# Patient Record
Sex: MALE | Employment: OTHER | URBAN - METROPOLITAN AREA
[De-identification: names, ages, dates, MRNs, and addresses within clinical notes are randomized per-mention and may not be internally consistent; named-entity substitution may affect disease eponyms.]

---

## 2018-07-30 ENCOUNTER — APPOINTMENT (OUTPATIENT)
Dept: CT IMAGING | Age: 68
End: 2018-07-30
Attending: EMERGENCY MEDICINE
Payer: COMMERCIAL

## 2018-07-30 ENCOUNTER — HOSPITAL ENCOUNTER (EMERGENCY)
Age: 68
Discharge: HOME | End: 2018-07-30
Attending: EMERGENCY MEDICINE
Payer: COMMERCIAL

## 2018-07-30 VITALS
DIASTOLIC BLOOD PRESSURE: 65 MMHG | OXYGEN SATURATION: 100 % | SYSTOLIC BLOOD PRESSURE: 142 MMHG | RESPIRATION RATE: 16 BRPM | TEMPERATURE: 98.4 F | HEART RATE: 76 BPM

## 2018-07-30 DIAGNOSIS — S05.11XA CONTUSION OF RIGHT EYE, INITIAL ENCOUNTER: Primary | ICD-10-CM

## 2018-07-30 DIAGNOSIS — H11.31 CONJUNCTIVAL HEMORRHAGE OF RIGHT EYE: ICD-10-CM

## 2018-07-30 PROCEDURE — 99283 EMERGENCY DEPT VISIT LOW MDM: CPT

## 2018-07-30 PROCEDURE — 70480 CT ORBIT/EAR/FOSSA W/O DYE: CPT

## 2018-07-30 PROCEDURE — 74011000250 HC RX REV CODE- 250: Performed by: EMERGENCY MEDICINE

## 2018-07-30 PROCEDURE — 74011250637 HC RX REV CODE- 250/637: Performed by: EMERGENCY MEDICINE

## 2018-07-30 RX ORDER — ACETAMINOPHEN 500 MG
1000 TABLET ORAL
Qty: 40 TAB | Refills: 0 | Status: SHIPPED | OUTPATIENT
Start: 2018-07-30

## 2018-07-30 RX ORDER — IBUPROFEN 600 MG/1
600 TABLET ORAL
Qty: 20 TAB | Refills: 0 | Status: SHIPPED | OUTPATIENT
Start: 2018-07-30

## 2018-07-30 RX ORDER — ERYTHROMYCIN 5 MG/G
OINTMENT OPHTHALMIC
Qty: 3.5 G | Refills: 1 | Status: SHIPPED | OUTPATIENT
Start: 2018-07-30 | End: 2018-08-06

## 2018-07-30 RX ORDER — HYDROCODONE BITARTRATE AND ACETAMINOPHEN 5; 325 MG/1; MG/1
1 TABLET ORAL
Status: COMPLETED | OUTPATIENT
Start: 2018-07-30 | End: 2018-07-30

## 2018-07-30 RX ADMIN — HYDROCODONE BITARTRATE AND ACETAMINOPHEN 1 TABLET: 5; 325 TABLET ORAL at 18:24

## 2018-07-30 RX ADMIN — FLUORESCEIN SODIUM 1 STRIP: 1 STRIP OPHTHALMIC at 18:24

## 2018-07-30 NOTE — ED PROVIDER NOTES
HPI Comments: Jaclyn Morris is a 78 y.o. Male who was poked in right eye with finger just prior to arrival while playing bb. No other injury. C/o pain, swelling, bleeding. Is able to see. Uses glasses. No contacts. Constant throbbing pain worse with eye movement. Nothing taken. No epistaxis The history is provided by the patient. Past Medical History:  
Diagnosis Date  Diabetes ([address] Past Surgical History:  
Procedure Laterality Date  HX ORTHOPAEDIC    
 L shoulder, R thumb Family History:  
Problem Relation Age of Onset  Diabetes Other Social History Social History  Marital status: SINGLE Spouse name: N/A  
 Number of children: N/A  
 Years of education: N/A Occupational History  Not on file. Social History Main Topics  Smoking status: Never Smoker  Smokeless tobacco: Not on file  Alcohol use No  
 Drug use: No  
 Sexual activity: Not on file Other Topics Concern  Not on file Social History Narrative  No narrative on file ALLERGIES: Review of patient's allergies indicates no known allergies. Review of Systems Constitutional: Negative for fever. HENT: Negative for congestion and trouble swallowing. Eyes: Positive for photophobia, pain, discharge, redness and visual disturbance. Respiratory: Negative for cough and shortness of breath. Cardiovascular: Negative for chest pain. Gastrointestinal: Negative for abdominal pain. Genitourinary: Negative for difficulty urinating. Musculoskeletal: Negative for gait problem. Skin: Positive for wound. Allergic/Immunologic: Negative for immunocompromised state. Neurological: Negative for headaches. Hematological: Does not bruise/bleed easily. Psychiatric/Behavioral: Negative for confusion. Vitals:  
 07/30/18 1811 07/30/18 1818 07/30/18 1820 BP: 157/85 142/65 Pulse: 76 Resp: 16 Temp: 98.4 °F (36.9 °C) SpO2: 100%  100% Physical Exam  
Constitutional: He is oriented to person, place, and time. Non-toxic appearance. He does not appear ill. No distress. HENT:  
Right Ear: External ear normal.  
Left Ear: External ear normal.  
Nose: Nose normal.  
Mouth/Throat: Oropharynx is clear and moist. No oropharyngeal exudate. Eyes: EOM are normal. Pupils are equal, round, and reactive to light. Right eye exhibits no chemosis and no hordeolum. No foreign body present in the right eye. Right conjunctiva is injected. Right conjunctiva has a hemorrhage (lateral subconjunctival). No scleral icterus. Fundoscopic exam: The right eye shows no hemorrhage and no papilledema. The right eye shows no red reflex and no venous pulsations. Slit lamp exam: The right eye shows no corneal abrasion, no corneal flare, no corneal ulcer, no foreign body, no hyphema, no hypopyon, no fluorescein uptake and no anterior chamber bulge. Neck: Normal range of motion. Cardiovascular: Normal rate, regular rhythm, normal heart sounds and intact distal pulses. Pulmonary/Chest: Effort normal and breath sounds normal. No respiratory distress. Abdominal: Soft. There is no tenderness. Musculoskeletal: Normal range of motion. He exhibits no edema. Neurological: He is alert and oriented to person, place, and time. Skin: Skin is warm and dry. He is not diaphoretic. Psychiatric: His behavior is normal.  
Nursing note and vitals reviewed. [facility] 
 
 
ED Course Procedures Vitals: 
Patient Vitals for the past 12 hrs: 
 Temp Pulse Resp BP SpO2  
07/30/18 1820 - - - - 100 % 07/30/18 1818 - - - 142/65 -  
07/30/18 1811 98.4 °F (36.9 °C) 76 16 157/85 100 % Medications ordered:  
Medications HYDROcodone-acetaminophen (NORCO) 5-325 mg per tablet 1 Tab (1 Tab Oral Given 7/30/18 1824)  
fluorescein (FUL-LIZ) 1 mg ophthalmic strip 1 Strip (1 Strip Both Eyes Given 7/30/18 1824) Lab findings: 
No results found for this or any previous visit (from the past 12 hour(s)). EKG interpretation by ED Physician: X-Ray, CT or other radiology findings or impressions: 
CT ORBIT EAR FOSSA WO CONT    (Results Pending)  
nothing acute per prelim reading Progress notes, Consult notes or additional Procedure notes:  
20/40 od Neg seidels. No diplopia with eom Appears more c/w subconjunctival hemorrhage I have discussed with patient and/or family/sig other the results, interpretation of any imaging if performed, suspected diagnosis and treatment plan to include instructions regarding the diagnoses listed to which understanding was expressed with all questions answered Reevaluation of patient:  
stable Disposition: 
Diagnosis: 1. Contusion of right eye, initial encounter 2. Conjunctival hemorrhage of right eye Disposition: home Follow-up Information Follow up With Details Comments Contact Info Almas Duke MD Schedule an appointment as soon as possible for a visit this week for follow up on your eye injury [address] [place] [address] [phone] For any change in vision, severe pain go to [facility] ER for evaluation Patient's Medications Start Taking ACETAMINOPHEN (TYLENOL EXTRA STRENGTH) 500 MG TABLET    Take 2 Tabs by mouth every six (6) hours as needed for Pain. ERYTHROMYCIN (ILOTYCIN) OPHTHALMIC OINTMENT    Apply 1/4 inch to right eye every 8 hours for 5 days IBUPROFEN (MOTRIN) 600 MG TABLET    Take 1 Tab by mouth every six (6) hours as needed for Pain. Continue Taking ASPIRIN 81 MG TABLET    Take 81 mg by mouth. ATORVASTATIN (LIPITOR) 20 MG TABLET    Take 20 mg by mouth daily. CALCIUM-CHOLECALCIFEROL, D3, (CALTRATE 600+D) TABLET    Take 1 Tab by mouth daily. GLIPIZIDE (GLUCOTROL) 10 MG TABLET    Take 10 mg by mouth two (2) times a day. LISINOPRIL (PRINIVIL, ZESTRIL) 10 MG TABLET    Take 10 mg by mouth daily.   
 METFORMIN (GLUCOPHAGE) 1,000 MG TABLET    Take 1,000 mg by mouth two (2) times daily (with meals). These Medications have changed No medications on file Stop Taking IBUPROFEN (MOTRIN) 800 MG TABLET    Take 1 Tab by mouth every eight (8) hours as needed for Pain.   
 PREDNISONE (DELTASONE) 10 MG TABLET    TAKE 6 PILLS DAILY FOR 1 WEEK THEN START TAKING 1 PILL LESS EACH DAY

## 2018-07-30 NOTE — ED NOTES
Visual acuity right distance- 20/40, left distance 20/50, both distance 20/40. Patient did not his glasses.

## 2018-07-30 NOTE — ED NOTES
Patient stated he injured his right eye playing basketball. Dr. Terrence Velazco at the bedside, patient is able to follow his finger and how many fingers he is holding up. Patient is resting with daughter at the bedside. Patient has ice pack on eye.

## 2018-07-31 ENCOUNTER — HOSPITAL ENCOUNTER (EMERGENCY)
Age: 68
Discharge: STILL ADMITTED OR ADMINISTRATIVE | End: 2018-07-31
Attending: EMERGENCY MEDICINE